# Patient Record
Sex: FEMALE
[De-identification: names, ages, dates, MRNs, and addresses within clinical notes are randomized per-mention and may not be internally consistent; named-entity substitution may affect disease eponyms.]

---

## 2022-12-26 ENCOUNTER — NURSE TRIAGE (OUTPATIENT)
Dept: OTHER | Facility: CLINIC | Age: 87
End: 2022-12-26

## 2022-12-26 NOTE — TELEPHONE ENCOUNTER
Location of patient: [place] 89 call from [place] with [facility]. Jaya Painting MRN: [id number]    Subjective: Caller states \"positive home covid test on 12/24/2022\"    Current Symptoms: Hx of CHFsats typically 95%, lowest was 92%. Symptoms started on 12/23/2022. Has cough, chest congestion, fatigue, no wheezing, shortness of breath or chest pain. Worse congestion at night and elevates head of bed. No current n/v/d. Fully covid vaccinated and 1 booster, flu and pneumonia vaccines up to date. Associated Symptoms: NA    Pain Severity: 0/10; N/A; none    Temperature: no fever (previously up to 101 as of yesterday)    What has been tried: mucinex-yesterday    Recommended disposition: call PCP within 24 hours    Care advice provided, patient verbalizes understanding; denies any other questions or concerns.     Outcome: Patient/caller agrees to follow-up with PCP     Reason for Disposition   [1] HIGH RISK for severe COVID complications (e.g., weak immune system, age > 59 years, obesity with BMI 30 or higher, pregnant, chronic lung disease or other chronic medical condition) AND [2] COVID symptoms (e.g., cough, fever)  (Exceptions: Already seen by PCP and no new or worsening symptoms.)    Protocols used: Coronavirus (COVID-19) Diagnosed or Suspected-ADULT-